# Patient Record
Sex: MALE
[De-identification: names, ages, dates, MRNs, and addresses within clinical notes are randomized per-mention and may not be internally consistent; named-entity substitution may affect disease eponyms.]

---

## 2021-10-04 PROBLEM — Z00.00 ENCOUNTER FOR PREVENTIVE HEALTH EXAMINATION: Status: ACTIVE | Noted: 2021-10-04

## 2021-10-05 ENCOUNTER — NON-APPOINTMENT (OUTPATIENT)
Age: 56
End: 2021-10-05

## 2021-10-05 ENCOUNTER — APPOINTMENT (OUTPATIENT)
Dept: CARDIOLOGY | Facility: CLINIC | Age: 56
End: 2021-10-05
Payer: COMMERCIAL

## 2021-10-05 VITALS
HEIGHT: 72 IN | DIASTOLIC BLOOD PRESSURE: 92 MMHG | SYSTOLIC BLOOD PRESSURE: 144 MMHG | OXYGEN SATURATION: 97 % | BODY MASS INDEX: 33.32 KG/M2 | HEART RATE: 78 BPM | TEMPERATURE: 97.8 F | WEIGHT: 246 LBS

## 2021-10-05 DIAGNOSIS — Z96.641 PRESENCE OF RIGHT ARTIFICIAL HIP JOINT: ICD-10-CM

## 2021-10-05 DIAGNOSIS — E66.3 OVERWEIGHT: ICD-10-CM

## 2021-10-05 PROCEDURE — 93000 ELECTROCARDIOGRAM COMPLETE: CPT

## 2021-10-05 PROCEDURE — 99205 OFFICE O/P NEW HI 60 MIN: CPT

## 2021-10-05 RX ORDER — LOSARTAN POTASSIUM 50 MG/1
50 TABLET, FILM COATED ORAL DAILY
Refills: 0 | Status: ACTIVE | COMMUNITY

## 2021-10-05 NOTE — HISTORY OF PRESENT ILLNESS
[FreeTextEntry1] : 56-year-old  gentleman referred for cardiac evaluation.\par \par He has history of hypertension, osteoarthritis status post right hip replacement and he may need knee replacement in future. He is very active but does not exercise per se. Does get short of breath easily but no PND, orthopnea, diaphoresis, dizziness, palpitation, edema. He denies any chest pain..\par \par He snores habitually but he has no other symptoms of sleep apnea syndrome. He has forgotten to take his antihypertensive medications last 3 days but otherwise he is compliant. He does not consume too much salt.

## 2021-10-05 NOTE — ASSESSMENT
[FreeTextEntry1] : Hypertension -not controlled as he did not take his medications last 3 days ; low-salt diet; compliance to meds has been stressed upon him; BP monitoring.\par \par Shortness of breath -I recommend echocardiography for evaluation size, wall motion, LVEF and valvular evaluation; also recommended stress test to see inducible symptoms/CAD).\par \par Overweight status - weight reduction diet exercise.\par \par Risk factor monitor has been discussed with him great length and he will be reeval by me after cardiac testing.

## 2021-11-19 ENCOUNTER — APPOINTMENT (OUTPATIENT)
Dept: CARDIOLOGY | Facility: CLINIC | Age: 56
End: 2021-11-19
Payer: COMMERCIAL

## 2021-11-19 DIAGNOSIS — R06.02 SHORTNESS OF BREATH: ICD-10-CM

## 2021-11-19 DIAGNOSIS — I10 ESSENTIAL (PRIMARY) HYPERTENSION: ICD-10-CM

## 2021-11-19 PROCEDURE — 93306 TTE W/DOPPLER COMPLETE: CPT

## 2021-11-19 PROCEDURE — 93015 CV STRESS TEST SUPVJ I&R: CPT

## 2022-01-21 ENCOUNTER — NON-APPOINTMENT (OUTPATIENT)
Age: 57
End: 2022-01-21